# Patient Record
Sex: MALE | Race: WHITE | NOT HISPANIC OR LATINO | Employment: STUDENT | URBAN - METROPOLITAN AREA
[De-identification: names, ages, dates, MRNs, and addresses within clinical notes are randomized per-mention and may not be internally consistent; named-entity substitution may affect disease eponyms.]

---
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
## 2023-01-05 ENCOUNTER — TELEPHONE (OUTPATIENT)
Dept: SPINE | Facility: CLINIC | Age: 23
End: 2023-01-05
Payer: COMMERCIAL

## 2023-01-05 NOTE — TELEPHONE ENCOUNTER
----- Message from Liz Castro sent at 1/5/2023 12:15 PM CST -----  Regarding: Appt  Contact: MotherYanira  Type:  Sooner Appointment Request    Caller is requesting a sooner appointment.  Caller declined first available appointment listed below.  Caller will not accept being placed on the waitlist and is requesting a message be sent to doctor.    Name of Caller:  Mother.  When is the first available appointment?    Symptoms:  back pain  Best Call Back Number:  [phone]   Additional Information:  Please call patient to schedule. Thanks!

## 2023-01-26 ENCOUNTER — OFFICE VISIT (OUTPATIENT)
Dept: SPINE | Facility: CLINIC | Age: 23
End: 2023-01-26
Payer: COMMERCIAL

## 2023-01-26 VITALS — BODY MASS INDEX: 29.29 KG/M2 | HEIGHT: 72 IN | WEIGHT: 216.25 LBS

## 2023-01-26 DIAGNOSIS — M54.50 CHRONIC BILATERAL LOW BACK PAIN, UNSPECIFIED WHETHER SCIATICA PRESENT: Primary | ICD-10-CM

## 2023-01-26 DIAGNOSIS — G89.29 CHRONIC BILATERAL LOW BACK PAIN, UNSPECIFIED WHETHER SCIATICA PRESENT: Primary | ICD-10-CM

## 2023-01-26 PROCEDURE — 1159F PR MEDICATION LIST DOCUMENTED IN MEDICAL RECORD: ICD-10-PCS | Mod: CPTII,S$GLB,, | Performed by: PHYSICAL MEDICINE & REHABILITATION

## 2023-01-26 PROCEDURE — 1159F MED LIST DOCD IN RCRD: CPT | Mod: CPTII,S$GLB,, | Performed by: PHYSICAL MEDICINE & REHABILITATION

## 2023-01-26 PROCEDURE — 99204 PR OFFICE/OUTPT VISIT, NEW, LEVL IV, 45-59 MIN: ICD-10-PCS | Mod: S$GLB,,, | Performed by: PHYSICAL MEDICINE & REHABILITATION

## 2023-01-26 PROCEDURE — 99204 OFFICE O/P NEW MOD 45 MIN: CPT | Mod: S$GLB,,, | Performed by: PHYSICAL MEDICINE & REHABILITATION

## 2023-01-26 PROCEDURE — 1160F PR REVIEW ALL MEDS BY PRESCRIBER/CLIN PHARMACIST DOCUMENTED: ICD-10-PCS | Mod: CPTII,S$GLB,, | Performed by: PHYSICAL MEDICINE & REHABILITATION

## 2023-01-26 PROCEDURE — 1160F RVW MEDS BY RX/DR IN RCRD: CPT | Mod: CPTII,S$GLB,, | Performed by: PHYSICAL MEDICINE & REHABILITATION

## 2023-01-26 NOTE — PROGRESS NOTES
SUBJECTIVE:    Patient ID: Henry Hall is a 22 y.o. male.    Chief Complaint: Low-back Pain    This is a 22-year-old man who sees Dr. Patten, pediatrician for his primary care.  Denies any chronic major medical problems.  No cancer history.  No IV drug use.  Presents with a 1-1/2 month history of low back pain at the lumbosacral junction which initially was radiating down the posterior portion of the left leg but not beyond the knee.  Possibly related to lifting.  He has been to 1 chiropractic treatment with no significant benefit.  He has been taking leave couple of times a day for the past 1 or 2 weeks.  That is helping.  He presents to me feeling significantly better.  Current complaint is of resolving low back pain at the lumbosacral junction with intermittent discomfort radiating into the left gluteal region and upper thigh posteriorly.  No symptoms below the knee.  No bowel or bladder dysfunction fever chills sweats or unexpected weight loss.  Pain level is 6/10.  Had x-rays done at the chiropractors office.  He brought me x-ray films.  They are of poor quality.  Possibly mild lower lumbar degenerative changes.  I note that his mother accompanies him today.  Also note that he is related to Dr. Hall the neurosurgeon.  He works as a  which does require some lifting        Past Medical History:   Diagnosis Date    Pain      Social History     Socioeconomic History    Marital status: Single   Tobacco Use    Smoking status: Never    Smokeless tobacco: Never   Social History Narrative    Lives with older brother, younger twin siblings, mom and dad.    9th grade.     History reviewed. No pertinent surgical history.  Family History   Problem Relation Age of Onset    Liver disease Maternal Grandmother     Liver disease Other     Celiac disease Neg Hx     Crohn's disease Neg Hx      There were no vitals filed for this visit.    Review of Systems   Constitutional:  Negative for  chills, diaphoresis, fatigue, fever and unexpected weight change.   HENT:  Negative for trouble swallowing.    Eyes:  Negative for visual disturbance.   Respiratory:  Negative for shortness of breath.    Cardiovascular:  Negative for chest pain.   Gastrointestinal:  Negative for abdominal pain, constipation, diarrhea, nausea and vomiting.   Genitourinary:  Negative for difficulty urinating.   Musculoskeletal:  Negative for arthralgias, back pain, gait problem, joint swelling, myalgias, neck pain and neck stiffness.   Neurological:  Negative for dizziness, speech difficulty, weakness, light-headedness, numbness and headaches.        Objective:      Physical Exam  Neurological:      Mental Status: He is alert and oriented to person, place, and time.      Comments: He is awake and in no acute distress  Mild tenderness palpation lumbar paraspinous musculature at the lumbosacral junction with no external lesions or palpable masses noted  Forward flexion of the lumbar spine is to about 45° before complains of pain at the lumbosacral junction.  Extension at just past neutral causes pain at the lumbosacral junction  He can heel and toe walk normally  Reflexes- +1-+2 reflexes at the following:   C5-Biceps   C6-Brachioradialis   C7-Triceps   L3/4-Patellar   S1-Achilles   Vannessa sign negative bilaterally  Strength testing- 5/5 strength in the following muscle groups:  C5-Elbow flexion  C6-Wrist extension  C7-Elbow extension  C8-Finger flexion  T1-Finger abduction  L2-Hip flexion  L3-Knee extension  L4-Ankle dorsiflexion  L5-Great toe extension  S1-Ankle plantar flexion       Straight leg raise negative bilaterally  TOM test negative bilaterally           Assessment:       1. Chronic bilateral low back pain, unspecified whether sciatica present             Plan:     He has a nonfocal neurological examination and no historical red flags.  I suspect he has low back pain on basis of degenerative disc disease and facet  arthropathy.  I recommended trial of physical therapy.  If he fails that consider MRI and subsequent epidural steroid injection versus radiofrequency ablation.  Follow-up with me at the completion therapy      Chronic bilateral low back pain, unspecified whether sciatica present  -     Ambulatory referral/consult to Physical/Occupational Therapy; Future; Expected date: 02/02/2023

## 2023-01-26 NOTE — PATIENT INSTRUCTIONS
Call [phone] to get physical therapy scheduled.   Contact Dr. Hinkle's office via [facility] or at [phone] when you are in week 5 of physical therapy to schedule follow up.

## 2023-04-04 ENCOUNTER — OFFICE VISIT (OUTPATIENT)
Dept: SURGERY | Facility: CLINIC | Age: 23
End: 2023-04-04
Payer: COMMERCIAL

## 2023-04-04 VITALS
SYSTOLIC BLOOD PRESSURE: 138 MMHG | HEART RATE: 80 BPM | BODY MASS INDEX: 28.44 KG/M2 | TEMPERATURE: 98 F | DIASTOLIC BLOOD PRESSURE: 72 MMHG | HEIGHT: 72 IN | WEIGHT: 210 LBS

## 2023-04-04 DIAGNOSIS — L05.91 PILONIDAL CYST: Primary | ICD-10-CM

## 2023-04-04 PROCEDURE — 1159F PR MEDICATION LIST DOCUMENTED IN MEDICAL RECORD: ICD-10-PCS | Mod: CPTII,S$GLB,, | Performed by: SURGERY

## 2023-04-04 PROCEDURE — 1160F RVW MEDS BY RX/DR IN RCRD: CPT | Mod: CPTII,S$GLB,, | Performed by: SURGERY

## 2023-04-04 PROCEDURE — 3075F SYST BP GE 130 - 139MM HG: CPT | Mod: CPTII,S$GLB,, | Performed by: SURGERY

## 2023-04-04 PROCEDURE — 99203 OFFICE O/P NEW LOW 30 MIN: CPT | Mod: S$GLB,,, | Performed by: SURGERY

## 2023-04-04 PROCEDURE — 1159F MED LIST DOCD IN RCRD: CPT | Mod: CPTII,S$GLB,, | Performed by: SURGERY

## 2023-04-04 PROCEDURE — 1160F PR REVIEW ALL MEDS BY PRESCRIBER/CLIN PHARMACIST DOCUMENTED: ICD-10-PCS | Mod: CPTII,S$GLB,, | Performed by: SURGERY

## 2023-04-04 PROCEDURE — 3075F PR MOST RECENT SYSTOLIC BLOOD PRESS GE 130-139MM HG: ICD-10-PCS | Mod: CPTII,S$GLB,, | Performed by: SURGERY

## 2023-04-04 PROCEDURE — 3008F BODY MASS INDEX DOCD: CPT | Mod: CPTII,S$GLB,, | Performed by: SURGERY

## 2023-04-04 PROCEDURE — 3078F DIAST BP <80 MM HG: CPT | Mod: CPTII,S$GLB,, | Performed by: SURGERY

## 2023-04-04 PROCEDURE — 3008F PR BODY MASS INDEX (BMI) DOCUMENTED: ICD-10-PCS | Mod: CPTII,S$GLB,, | Performed by: SURGERY

## 2023-04-04 PROCEDURE — 99203 PR OFFICE/OUTPT VISIT, NEW, LEVL III, 30-44 MIN: ICD-10-PCS | Mod: S$GLB,,, | Performed by: SURGERY

## 2023-04-04 PROCEDURE — 3078F PR MOST RECENT DIASTOLIC BLOOD PRESSURE < 80 MM HG: ICD-10-PCS | Mod: CPTII,S$GLB,, | Performed by: SURGERY

## 2023-04-04 NOTE — PROGRESS NOTES
Subjective:       Patient ID: Henry Hall is a 23 y.o. male.    Chief Complaint: Consult - pilonidal cyst      HPI:  23-year-old male referred to the office with a pilonidal cyst.  Patient reports that he would noticed several small pores in the midline going back about 5 or 6 years.  He is never had any issues with it.  It has never drained, abscessed.  No history of any intervention.  Currently no pain or color change.  No swelling.    Past Medical History:   Diagnosis Date    Pain      Past Surgical History:   Procedure Laterality Date    left meniscus Left 2017     Review of patient's allergies indicates:  No Known Allergies  Medication List with Changes/Refills   Current Medications    ONDANSETRON (ZOFRAN) 8 MG TABLET    Take 1 tablet (8 mg total) by mouth every 8 (eight) hours as needed for Nausea.   Discontinued Medications    AMITRIPTYLINE (ELAVIL) 25 MG TABLET    Take 1 tablet (25 mg total) by mouth every evening.    DICYCLOMINE (BENTYL) 20 MG TABLET        METRONIDAZOLE (FLAGYL) 500 MG TABLET        RANITIDINE (ZANTAC) 300 MG TABLET    Take 1 tablet (300 mg total) by mouth 2 (two) times daily.     Family History   Problem Relation Age of Onset    Liver disease Maternal Grandmother     Liver disease Other     Celiac disease Neg Hx     Crohn's disease Neg Hx      Social History     Socioeconomic History    Marital status: Single   Tobacco Use    Smoking status: Never    Smokeless tobacco: Never   Social History Narrative    Lives with older brother, younger twin siblings, mom and dad.    9th grade.         Review of Systems   Constitutional:  Negative for appetite change, chills, fever and unexpected weight change.   HENT:  Negative for hearing loss, rhinorrhea, sore throat and voice change.    Eyes:  Negative for photophobia and visual disturbance.   Respiratory:  Negative for cough, choking and shortness of breath.    Cardiovascular:  Negative for chest pain, palpitations and leg swelling.    Gastrointestinal:  Negative for abdominal pain, blood in stool, constipation, diarrhea, nausea and vomiting.   Endocrine: Negative for cold intolerance, heat intolerance, polydipsia and polyuria.   Musculoskeletal:  Negative for arthralgias, back pain, joint swelling and neck stiffness.   Skin:  Negative for color change, pallor and rash.   Neurological:  Negative for dizziness, seizures, syncope and headaches.   Hematological:  Negative for adenopathy. Does not bruise/bleed easily.   Psychiatric/Behavioral:  Negative for agitation, behavioral problems and confusion.      Objective:      Physical Exam  Constitutional:       General: He is awake. He is not in acute distress.     Appearance: Normal appearance. He is well-developed. He is not toxic-appearing.   HENT:      Head: Normocephalic and atraumatic.   Pulmonary:      Effort: No tachypnea, bradypnea, accessory muscle usage or respiratory distress.   Abdominal:      General: There is no distension.      Palpations: Abdomen is soft.   Musculoskeletal:      Cervical back: Neck supple.   Skin:     Comments: Three small pores all  by several mm in the midline over the sacrum.  I could see hair coming from all through these ports.  Some was removed.  No tenderness.  No induration.  No color change, cellulitis.   Neurological:      Mental Status: He is alert and oriented to person, place, and time.   Psychiatric:         Behavior: Behavior is cooperative.       Assessment/Plan:   Pilonidal cyst    Patient has good evidence of having a pilonidal cyst.  We discussed the options for excision.  Patient would like to defer excision for now.  I am comfortable with observation.  Return to clinic if experiencing any infectious or inflammatory symptoms.  ED also available for abscess

## 2023-05-20 ENCOUNTER — PATIENT MESSAGE (OUTPATIENT)
Dept: DERMATOLOGY | Facility: CLINIC | Age: 23
End: 2023-05-20
Payer: COMMERCIAL